# Patient Record
Sex: FEMALE | Race: WHITE | URBAN - METROPOLITAN AREA
[De-identification: names, ages, dates, MRNs, and addresses within clinical notes are randomized per-mention and may not be internally consistent; named-entity substitution may affect disease eponyms.]

---

## 2020-06-15 ENCOUNTER — OFFICE VISIT (OUTPATIENT)
Dept: SURGERY | Age: 25
End: 2020-06-15

## 2020-06-15 VITALS
HEART RATE: 70 BPM | DIASTOLIC BLOOD PRESSURE: 84 MMHG | HEIGHT: 63 IN | SYSTOLIC BLOOD PRESSURE: 128 MMHG | RESPIRATION RATE: 20 BRPM | TEMPERATURE: 98.4 F | WEIGHT: 227 LBS | BODY MASS INDEX: 40.22 KG/M2

## 2020-06-15 DIAGNOSIS — R19.8 UMBILICAL DISCHARGE: Primary | ICD-10-CM

## 2020-06-15 DIAGNOSIS — E66.01 OBESITY, MORBID (HCC): ICD-10-CM

## 2020-06-15 RX ORDER — ETONOGESTREL 68 MG/1
IMPLANT SUBCUTANEOUS
COMMUNITY

## 2020-06-15 RX ORDER — CITALOPRAM 40 MG/1
TABLET, FILM COATED ORAL
COMMUNITY

## 2020-06-15 NOTE — Clinical Note
6/15/20 Patient: Radha Palacios YOB: 1995 Date of Visit: 6/15/2020 Erin Gomez MD 
[address] [facility] [address] VIA Facsimile: [phone] Dear Erin Gomez MD, Thank you for referring Ms. Sivan Atkins to [address] for evaluation. My notes for this consultation are attached. If you have questions, please do not hesitate to call me. I look forward to following your patient along with you.  
 
 
Sincerely, 
 
Argenis Rao MD

## 2020-06-15 NOTE — PROGRESS NOTES
To:  Chari Vera NP  CC:  Vonnie Mcallister MD, Giorgio Lewis MD    From: Karina Fung MD    Thank you for sending Nuvia Alatorre to see us. Please note that this dictation was completed with Netlog, the computer voice recognition software. Quite often unanticipated grammatical, syntax, homophones, and other interpretive errors are inadvertently transcribed by the software. Please disregard these errors. Please excuse any errors that have escaped final proofreading. Encounter Date: 6/15/2020  History and Physical    Assessment:   Intermittent umbilical irritation. No obvious sebaceous cyst or hernia on exam.    No prior laparoscopies via umbilical incision. Body mass index is 40.21 kg/m². Plan:   I recommended washing the umbilicus with a bulb syringe instead of Q-tips. Q-tips are likely resulting in impacted material with subsequent irritation. If this continues to be a problem despite this change, we would end up needing to get a CAT scan as ultrasound is unreliable in the umbilical area due to air interference with sound waves. HPI:   Nuvia Alatorre is a 30 y.o. female who is seen in consultation at the request of Chari Vera for evaluation of a umbilical bloody discharge. She says it has been happening about once a month for about a year. She has a nexplanon so she is not getting her menses and therefore is unsure if it is related to her cycle. She says she has PCO S. She has never had any abdominal operations including diagnostic laparoscopies. There is no history of endometriosis she states that she tries to clean her bellybutton that well using Q-tips. 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 Past Medical History:   Diagnosis Date    Anxiety     Depression      History reviewed. No pertinent surgical history.    Family History   Problem Relation Age of Onset    Heart Disease Mother     Hypertension Mother    Cheryle Salomon Stroke Mother     Diabetes Mother      Social History     Tobacco Use    Smoking status: Current Every Day Smoker    Smokeless tobacco: Never Used   Substance Use Topics    Alcohol use: Yes      Current Outpatient Medications   Medication Sig    citalopram (CeleXA) 40 mg tablet Celexa 40 mg tablet   Take 1 tablet every day by oral route.  etonogestreL (Nexplanon) 68 mg impl Nexplanon 68 mg subdermal implant   Inject by subcutaneous route. No current facility-administered medications for this visit. Allergies:  No Known Allergies    Review of Systems:  10 systems reviewed. See scanned sheet in \"Media\" section. See HPI for pertinent positives and negatives. Objective:     Visit Vitals  /84   Pulse 70   Temp 98.4 °F (36.9 °C)   Resp 20   Ht 5' 3\" (1.6 m)   Wt 103 kg (227 lb)   BMI 40.21 kg/m²       Physical Exam:  General appearance  Alert, cooperative, no distress, appears stated age   [de-identified] Anicteric           Lungs   Clear to auscultation bilaterally   Heart  Regular rate and rhythm. Abdomen   Soft, non-tender. Diastasis recti. No hernia. No blood or discharge in the umbilicus. No obvious sebaceous cyst at the base. A single free hair. Extremities no cyanosis or edema   Pulses 2+ right radial       Lymph nodes No palpable  LAD.    Neurologic Without overt sensory or motor deficit       Signed By: Morena Christian MD     Anahi 15, 2020

## 2020-06-15 NOTE — PROGRESS NOTES
Chief Complaint   Patient presents with    Advice Only     seen at the request of Dr. Katalina chandler from [facility] 93        Discussed advanced directive. Patient states that she does not have an advanced directive.

## 2022-03-19 PROBLEM — E66.01 OBESITY, MORBID (HCC): Status: ACTIVE | Noted: 2020-06-15

## 2023-03-21 LAB
ABO, EXTERNAL RESULT: NORMAL
C. TRACHOMATIS, EXTERNAL RESULT: NEGATIVE
HEP B, EXTERNAL RESULT: NEGATIVE
HEPATITIS C ANTIBODY, EXTERNAL RESULT: NONREACTIVE
HIV, EXTERNAL RESULT: NONREACTIVE
N. GONORRHOEAE, EXTERNAL RESULT: NEGATIVE
RH FACTOR, EXTERNAL RESULT: NEGATIVE
RUBELLA TITER, EXTERNAL RESULT: NORMAL
T. PALLIDUM (SYPHILIS) ANTIBODY, EXTERNAL RESULT: NONREACTIVE

## 2023-05-12 RX ORDER — ETONOGESTREL 68 MG/1
IMPLANT SUBCUTANEOUS
COMMUNITY

## 2023-05-12 RX ORDER — CITALOPRAM 40 MG/1
TABLET ORAL
COMMUNITY

## 2023-08-04 LAB — RHOGAM, EXTERNAL RESULT: NORMAL

## 2023-10-01 LAB — GBS, EXTERNAL RESULT: NEGATIVE

## 2023-10-11 ENCOUNTER — HOSPITAL ENCOUNTER (OUTPATIENT)
Facility: HOSPITAL | Age: 28
Setting detail: OBSERVATION
Discharge: HOME | End: 2023-10-11
Attending: OBSTETRICS & GYNECOLOGY | Admitting: OBSTETRICS & GYNECOLOGY
Payer: COMMERCIAL

## 2023-10-11 VITALS
OXYGEN SATURATION: 96 % | HEART RATE: 79 BPM | DIASTOLIC BLOOD PRESSURE: 78 MMHG | RESPIRATION RATE: 16 BRPM | TEMPERATURE: 98 F | SYSTOLIC BLOOD PRESSURE: 117 MMHG

## 2023-10-11 PROBLEM — O16.3 ELEVATED BLOOD PRESSURE AFFECTING PREGNANCY IN THIRD TRIMESTER, ANTEPARTUM: Status: ACTIVE | Noted: 2023-10-11

## 2023-10-11 LAB
ALBUMIN SERPL-MCNC: 2.6 G/DL (ref 3.5–5)
ALBUMIN/GLOB SERPL: 0.8 (ref 1.1–2.2)
ALP SERPL-CCNC: 220 U/L (ref 45–117)
ALT SERPL-CCNC: 10 U/L (ref 12–78)
ANION GAP SERPL CALC-SCNC: 7 MMOL/L (ref 5–15)
AST SERPL-CCNC: 9 U/L (ref 15–37)
BASOPHILS # BLD: 0 K/UL (ref 0–0.1)
BASOPHILS NFR BLD: 0 % (ref 0–1)
BILIRUB SERPL-MCNC: 0.4 MG/DL (ref 0.2–1)
BUN SERPL-MCNC: 8 MG/DL (ref 6–20)
BUN/CREAT SERPL: 14 (ref 12–20)
CALCIUM SERPL-MCNC: 8.1 MG/DL (ref 8.5–10.1)
CHLORIDE SERPL-SCNC: 110 MMOL/L (ref 97–108)
CO2 SERPL-SCNC: 21 MMOL/L (ref 21–32)
CREAT SERPL-MCNC: 0.59 MG/DL (ref 0.55–1.02)
CREAT UR-MCNC: 185 MG/DL
DIFFERENTIAL METHOD BLD: ABNORMAL
EOSINOPHIL # BLD: 0.1 K/UL (ref 0–0.4)
EOSINOPHIL NFR BLD: 1 % (ref 0–7)
ERYTHROCYTE [DISTWIDTH] IN BLOOD BY AUTOMATED COUNT: 12.8 % (ref 11.5–14.5)
GLOBULIN SER CALC-MCNC: 3.2 G/DL (ref 2–4)
GLUCOSE SERPL-MCNC: 145 MG/DL (ref 65–100)
HCT VFR BLD AUTO: 39.8 % (ref 35–47)
HGB BLD-MCNC: 13.6 G/DL (ref 11.5–16)
IMM GRANULOCYTES # BLD AUTO: 0.1 K/UL (ref 0–0.04)
IMM GRANULOCYTES NFR BLD AUTO: 1 % (ref 0–0.5)
LYMPHOCYTES # BLD: 1.6 K/UL (ref 0.8–3.5)
LYMPHOCYTES NFR BLD: 16 % (ref 12–49)
MCH RBC QN AUTO: 29 PG (ref 26–34)
MCHC RBC AUTO-ENTMCNC: 34.2 G/DL (ref 30–36.5)
MCV RBC AUTO: 84.9 FL (ref 80–99)
MONOCYTES # BLD: 0.7 K/UL (ref 0–1)
MONOCYTES NFR BLD: 8 % (ref 5–13)
NEUTS SEG # BLD: 7.2 K/UL (ref 1.8–8)
NEUTS SEG NFR BLD: 74 % (ref 32–75)
NRBC # BLD: 0 K/UL (ref 0–0.01)
NRBC BLD-RTO: 0 PER 100 WBC
PLATELET # BLD AUTO: 206 K/UL (ref 150–400)
PMV BLD AUTO: 11.6 FL (ref 8.9–12.9)
POTASSIUM SERPL-SCNC: 4.1 MMOL/L (ref 3.5–5.1)
PROT SERPL-MCNC: 5.8 G/DL (ref 6.4–8.2)
PROT UR-MCNC: 25 MG/DL (ref 0–11.9)
PROT/CREAT UR-RTO: 0.1
RBC # BLD AUTO: 4.69 M/UL (ref 3.8–5.2)
SODIUM SERPL-SCNC: 138 MMOL/L (ref 136–145)
WBC # BLD AUTO: 9.6 K/UL (ref 3.6–11)

## 2023-10-11 PROCEDURE — 36415 COLL VENOUS BLD VENIPUNCTURE: CPT

## 2023-10-11 PROCEDURE — 85025 COMPLETE CBC W/AUTO DIFF WBC: CPT

## 2023-10-11 PROCEDURE — 99285 EMERGENCY DEPT VISIT HI MDM: CPT

## 2023-10-11 PROCEDURE — G0379 DIRECT REFER HOSPITAL OBSERV: HCPCS

## 2023-10-11 PROCEDURE — 82570 ASSAY OF URINE CREATININE: CPT

## 2023-10-11 PROCEDURE — 84156 ASSAY OF PROTEIN URINE: CPT

## 2023-10-11 PROCEDURE — G0378 HOSPITAL OBSERVATION PER HR: HCPCS

## 2023-10-11 PROCEDURE — 80053 COMPREHEN METABOLIC PANEL: CPT

## 2023-10-11 RX ORDER — ACETAMINOPHEN 500 MG
1000 TABLET ORAL EVERY 8 HOURS SCHEDULED
Status: DISCONTINUED | OUTPATIENT
Start: 2023-10-11 | End: 2023-10-11

## 2023-10-11 RX ORDER — ACETAMINOPHEN 500 MG
1000 TABLET ORAL EVERY 8 HOURS PRN
Status: DISCONTINUED | OUTPATIENT
Start: 2023-10-11 | End: 2023-10-11 | Stop reason: HOSPADM

## 2023-10-11 RX ORDER — ONDANSETRON 4 MG/1
4 TABLET, ORALLY DISINTEGRATING ORAL EVERY 8 HOURS PRN
Status: DISCONTINUED | OUTPATIENT
Start: 2023-10-11 | End: 2023-10-11 | Stop reason: HOSPADM

## 2023-10-11 RX ORDER — ONDANSETRON 2 MG/ML
4 INJECTION INTRAMUSCULAR; INTRAVENOUS EVERY 6 HOURS PRN
Status: DISCONTINUED | OUTPATIENT
Start: 2023-10-11 | End: 2023-10-11 | Stop reason: HOSPADM

## 2023-10-11 NOTE — H&P
1612 [facility] labs returned:   Hgb 13.6, Plt 206, Cr 0.59, AST 9, ALT 10  P/C 0.1  Bps: 120s/70s, no mild ranges detected  Patient Vitals for the past 24 hrs:   BP Pulse SpO2   10/11/23 1757 -- -- 96 %   10/11/23 1752 -- -- 98 %   10/11/23 1747 -- -- 98 %   10/11/23 1745 117/78 79 --   10/11/23 1742 -- 83 98 %   10/11/23 1737 -- 81 98 %   10/11/23 1732 -- 80 98 %   10/11/23 1730 119/75 80 --   10/11/23 1727 -- 83 98 %   10/11/23 1722 -- 83 98 %   10/11/23 1712 123/78 80 --   10/11/23 1709 -- 87 98 %         After reviewing findings, pt ntoes this was her last day at work and she was very very highly anxious about that. Reviewed PIH precautions. RTO x 1-2 days for membrane sweep per patient request.   FKCs and labor precautions reviewed. Discharged home.      Hanley Landau, MD
Authorizing Provider   citalopram (CELEXA) 40 MG tablet Celexa 40 mg tablet   Take 1 tablet every day by oral route. Automatic Reconciliation, Ar   etonogestrel (NEXPLANON) 68 MG implant Nexplanon 68 mg subdermal implant   Inject by subcutaneous route. Automatic Reconciliation, Ar        Review of Systems:  A comprehensive review of systems was negative except for that written in the History of Present Illness. Objective:     Vitals: There were no vitals filed for this visit. No data recorded. No data recorded      There were no vitals taken for this visit. General:   alert, appears stated age, and cooperative   Skin:   normal   HEENT:  PERRLA   Lungs:   No increased WOB   Heart:   Regular rate   Breasts:      Abdomen:  Soft, nontender   Pelvis:  deferred   FHT:  145bpm mod variability, +Accels, no decels   Uterine Size: S>D   Presentations:    Cervix: In office check     Dilation: 1cm    Effacement: 70%    Station:  -3    Consistency:     Position:    NO edema on exam    Lab Review   Rh neg   AFP:NML   One hour GTT: abnormal, 3h GTT normal (1/4 abnormal)    Assessment and Plan:     Principal Problem:    Elevated blood pressure affecting pregnancy in third trimester, antepartum  Resolved Problems:    * No resolved hospital problems. *       29yo G1 at 38.3 with elevated blood pressures in the office, sent over for [facility] evaluation. Elevated blood pressures: no pih symptoms of RUQ pain, persistent headaches, blurred vision or BLE edema.    - serial BP monitoring  - PIH labs sent  - Cat 1 fetal tracing  - CEFM while undergoing evaluation    Discomforts of pregnancy:   - tylenol, warm baths reviewed    Pregnancy issues:  - Rh negative, s/p rhogam at 28 weeks  - suspected macrosomia with growth 90%ile at 35w5d (7lb2oz)  - LEEP with PEDRO III with normal paps x2   - APA- normal distal long bones  - Abnormal 1h GTT with normal 3h GTT (1/4 abnormal)    Dispo: pending evaluation    Phuc Alston,

## 2023-10-11 NOTE — PROGRESS NOTES
1630: Patient arrived to L&D room 216 for pre-e rule out. Pt oriented to room and unit, plan of care discussed with pt, pt verbalized understanding. 1650: Dr. Rebecca Lopes at bedside to assess pt. 1740: Dr Rebecca Lopes at bedside, MD stated pt can be discharged home. 1830: Pt given written and verbal discharge instructions, pt verbalized understanding. Pt ambulated off the unit in stable condition.

## 2023-10-17 ENCOUNTER — HOSPITAL ENCOUNTER (INPATIENT)
Facility: HOSPITAL | Age: 28
LOS: 2 days | Discharge: HOME | End: 2023-10-20
Attending: OBSTETRICS & GYNECOLOGY | Admitting: OBSTETRICS & GYNECOLOGY
Payer: COMMERCIAL

## 2023-10-17 PROCEDURE — 86901 BLOOD TYPING SEROLOGIC RH(D): CPT

## 2023-10-17 PROCEDURE — 85027 COMPLETE CBC AUTOMATED: CPT

## 2023-10-17 PROCEDURE — 86900 BLOOD TYPING SEROLOGIC ABO: CPT

## 2023-10-17 PROCEDURE — G0379 DIRECT REFER HOSPITAL OBSERV: HCPCS

## 2023-10-17 PROCEDURE — 36415 COLL VENOUS BLD VENIPUNCTURE: CPT

## 2023-10-17 PROCEDURE — 2580000003 HC RX 258: Performed by: OBSTETRICS & GYNECOLOGY

## 2023-10-17 PROCEDURE — 7210000100 HC LABOR FEE PER 1 HR: Performed by: OBSTETRICS & GYNECOLOGY

## 2023-10-17 PROCEDURE — G0378 HOSPITAL OBSERVATION PER HR: HCPCS

## 2023-10-17 PROCEDURE — 86850 RBC ANTIBODY SCREEN: CPT

## 2023-10-17 PROCEDURE — 80053 COMPREHEN METABOLIC PANEL: CPT

## 2023-10-17 RX ADMIN — SODIUM CHLORIDE, PRESERVATIVE FREE 10 ML: 5 INJECTION INTRAVENOUS at 23:58

## 2023-10-18 ENCOUNTER — ANESTHESIA EVENT (OUTPATIENT)
Facility: HOSPITAL | Age: 28
End: 2023-10-18
Payer: COMMERCIAL

## 2023-10-18 ENCOUNTER — ANESTHESIA (OUTPATIENT)
Facility: HOSPITAL | Age: 28
End: 2023-10-18
Payer: COMMERCIAL

## 2023-10-18 PROBLEM — O16.3 ELEVATED BLOOD PRESSURE AFFECTING PREGNANCY IN THIRD TRIMESTER, ANTEPARTUM: Status: RESOLVED | Noted: 2023-10-11 | Resolved: 2023-10-18

## 2023-10-18 PROBLEM — E66.01 OBESITY, MORBID (HCC): Status: RESOLVED | Noted: 2020-06-15 | Resolved: 2023-10-18

## 2023-10-18 PROBLEM — Z3A.40 40 WEEKS GESTATION OF PREGNANCY: Status: ACTIVE | Noted: 2023-10-18

## 2023-10-18 LAB
ABO + RH BLD: NORMAL
ALBUMIN SERPL-MCNC: 2.6 G/DL (ref 3.5–5)
ALBUMIN/GLOB SERPL: 0.8 (ref 1.1–2.2)
ALP SERPL-CCNC: 230 U/L (ref 45–117)
ALT SERPL-CCNC: 8 U/L (ref 12–78)
ANION GAP SERPL CALC-SCNC: 5 MMOL/L (ref 5–15)
AST SERPL-CCNC: 14 U/L (ref 15–37)
BILIRUB SERPL-MCNC: 0.3 MG/DL (ref 0.2–1)
BLOOD GROUP ANTIBODIES SERPL: NORMAL
BUN SERPL-MCNC: 7 MG/DL (ref 6–20)
BUN/CREAT SERPL: 12 (ref 12–20)
CALCIUM SERPL-MCNC: 8.9 MG/DL (ref 8.5–10.1)
CHLORIDE SERPL-SCNC: 109 MMOL/L (ref 97–108)
CO2 SERPL-SCNC: 25 MMOL/L (ref 21–32)
CREAT SERPL-MCNC: 0.6 MG/DL (ref 0.55–1.02)
ERYTHROCYTE [DISTWIDTH] IN BLOOD BY AUTOMATED COUNT: 12.8 % (ref 11.5–14.5)
GLOBULIN SER CALC-MCNC: 3.2 G/DL (ref 2–4)
GLUCOSE SERPL-MCNC: 88 MG/DL (ref 65–100)
HCT VFR BLD AUTO: 39.9 % (ref 35–47)
HGB BLD-MCNC: 13.6 G/DL (ref 11.5–16)
MCH RBC QN AUTO: 29.2 PG (ref 26–34)
MCHC RBC AUTO-ENTMCNC: 34.1 G/DL (ref 30–36.5)
MCV RBC AUTO: 85.8 FL (ref 80–99)
NRBC # BLD: 0 K/UL (ref 0–0.01)
NRBC BLD-RTO: 0 PER 100 WBC
PLATELET # BLD AUTO: 197 K/UL (ref 150–400)
PMV BLD AUTO: 11.4 FL (ref 8.9–12.9)
POTASSIUM SERPL-SCNC: 3.7 MMOL/L (ref 3.5–5.1)
PROT SERPL-MCNC: 5.8 G/DL (ref 6.4–8.2)
RBC # BLD AUTO: 4.65 M/UL (ref 3.8–5.2)
SODIUM SERPL-SCNC: 139 MMOL/L (ref 136–145)
SPECIMEN EXP DATE BLD: NORMAL
WBC # BLD AUTO: 13.6 K/UL (ref 3.6–11)

## 2023-10-18 PROCEDURE — 6360000002 HC RX W HCPCS: Performed by: NURSE ANESTHETIST, CERTIFIED REGISTERED

## 2023-10-18 PROCEDURE — 1120000000 HC RM PRIVATE OB

## 2023-10-18 PROCEDURE — 7210000100 HC LABOR FEE PER 1 HR: Performed by: OBSTETRICS & GYNECOLOGY

## 2023-10-18 PROCEDURE — 4A1HXCZ MONITORING OF PRODUCTS OF CONCEPTION, CARDIAC RATE, EXTERNAL APPROACH: ICD-10-PCS | Performed by: OBSTETRICS & GYNECOLOGY

## 2023-10-18 PROCEDURE — 2580000003 HC RX 258: Performed by: OBSTETRICS & GYNECOLOGY

## 2023-10-18 PROCEDURE — 2500000003 HC RX 250 WO HCPCS: Performed by: NURSE ANESTHETIST, CERTIFIED REGISTERED

## 2023-10-18 PROCEDURE — 51701 INSERT BLADDER CATHETER: CPT

## 2023-10-18 PROCEDURE — 00HU33Z INSERTION OF INFUSION DEVICE INTO SPINAL CANAL, PERCUTANEOUS APPROACH: ICD-10-PCS | Performed by: ANESTHESIOLOGY

## 2023-10-18 PROCEDURE — 7220000101 HC DELIVERY VAGINAL/SINGLE: Performed by: OBSTETRICS & GYNECOLOGY

## 2023-10-18 PROCEDURE — 6360000002 HC RX W HCPCS: Performed by: OBSTETRICS & GYNECOLOGY

## 2023-10-18 PROCEDURE — 10907ZC DRAINAGE OF AMNIOTIC FLUID, THERAPEUTIC FROM PRODUCTS OF CONCEPTION, VIA NATURAL OR ARTIFICIAL OPENING: ICD-10-PCS | Performed by: OBSTETRICS & GYNECOLOGY

## 2023-10-18 PROCEDURE — 3700000025 EPIDURAL BLOCK: Performed by: ANESTHESIOLOGY

## 2023-10-18 PROCEDURE — 6370000000 HC RX 637 (ALT 250 FOR IP): Performed by: OBSTETRICS & GYNECOLOGY

## 2023-10-18 PROCEDURE — 4A1H74Z MONITORING OF PRODUCTS OF CONCEPTION, CARDIAC ELECTRICAL ACTIVITY, VIA NATURAL OR ARTIFICIAL OPENING: ICD-10-PCS | Performed by: OBSTETRICS & GYNECOLOGY

## 2023-10-18 PROCEDURE — 51702 INSERT TEMP BLADDER CATH: CPT

## 2023-10-18 PROCEDURE — 94761 N-INVAS EAR/PLS OXIMETRY MLT: CPT

## 2023-10-18 PROCEDURE — 10H073Z INSERTION OF MONITORING ELECTRODE INTO PRODUCTS OF CONCEPTION, VIA NATURAL OR ARTIFICIAL OPENING: ICD-10-PCS | Performed by: OBSTETRICS & GYNECOLOGY

## 2023-10-18 PROCEDURE — 3700000156 HC EPIDURAL ANESTHESIA: Performed by: NURSE ANESTHETIST, CERTIFIED REGISTERED

## 2023-10-18 PROCEDURE — 0KQM0ZZ REPAIR PERINEUM MUSCLE, OPEN APPROACH: ICD-10-PCS | Performed by: OBSTETRICS & GYNECOLOGY

## 2023-10-18 RX ORDER — ACETAMINOPHEN 500 MG
1000 TABLET ORAL EVERY 8 HOURS SCHEDULED
Status: DISCONTINUED | OUTPATIENT
Start: 2023-10-18 | End: 2023-10-20 | Stop reason: HOSPADM

## 2023-10-18 RX ORDER — ONDANSETRON 2 MG/ML
4 INJECTION INTRAMUSCULAR; INTRAVENOUS EVERY 6 HOURS PRN
Status: DISCONTINUED | OUTPATIENT
Start: 2023-10-18 | End: 2023-10-18 | Stop reason: SDUPTHER

## 2023-10-18 RX ORDER — ONDANSETRON 2 MG/ML
4 INJECTION INTRAMUSCULAR; INTRAVENOUS EVERY 6 HOURS PRN
Status: DISCONTINUED | OUTPATIENT
Start: 2023-10-18 | End: 2023-10-20 | Stop reason: HOSPADM

## 2023-10-18 RX ORDER — ACETAMINOPHEN 325 MG/1
650 TABLET ORAL EVERY 4 HOURS PRN
Status: DISCONTINUED | OUTPATIENT
Start: 2023-10-18 | End: 2023-10-20 | Stop reason: HOSPADM

## 2023-10-18 RX ORDER — METHYLERGONOVINE MALEATE 0.2 MG/ML
200 INJECTION INTRAVENOUS PRN
Status: DISCONTINUED | OUTPATIENT
Start: 2023-10-18 | End: 2023-10-20 | Stop reason: HOSPADM

## 2023-10-18 RX ORDER — CARBOPROST TROMETHAMINE 250 UG/ML
250 INJECTION, SOLUTION INTRAMUSCULAR PRN
Status: DISCONTINUED | OUTPATIENT
Start: 2023-10-18 | End: 2023-10-20 | Stop reason: HOSPADM

## 2023-10-18 RX ORDER — SODIUM CHLORIDE, SODIUM LACTATE, POTASSIUM CHLORIDE, AND CALCIUM CHLORIDE .6; .31; .03; .02 G/100ML; G/100ML; G/100ML; G/100ML
500 INJECTION, SOLUTION INTRAVENOUS PRN
Status: DISCONTINUED | OUTPATIENT
Start: 2023-10-18 | End: 2023-10-20 | Stop reason: HOSPADM

## 2023-10-18 RX ORDER — EPHEDRINE SULFATE/0.9% NACL/PF 50 MG/5 ML
10 SYRINGE (ML) INTRAVENOUS ONCE
Status: DISCONTINUED | OUTPATIENT
Start: 2023-10-18 | End: 2023-10-20 | Stop reason: HOSPADM

## 2023-10-18 RX ORDER — TRANEXAMIC ACID 10 MG/ML
1000 INJECTION, SOLUTION INTRAVENOUS
Status: ACTIVE | OUTPATIENT
Start: 2023-10-18 | End: 2023-10-19

## 2023-10-18 RX ORDER — FAMOTIDINE 20 MG/1
20 TABLET, FILM COATED ORAL 2 TIMES DAILY
Status: DISCONTINUED | OUTPATIENT
Start: 2023-10-18 | End: 2023-10-20 | Stop reason: HOSPADM

## 2023-10-18 RX ORDER — SODIUM CHLORIDE 0.9 % (FLUSH) 0.9 %
5-40 SYRINGE (ML) INJECTION PRN
Status: DISCONTINUED | OUTPATIENT
Start: 2023-10-18 | End: 2023-10-18 | Stop reason: SDUPTHER

## 2023-10-18 RX ORDER — DOCUSATE SODIUM 100 MG/1
100 CAPSULE, LIQUID FILLED ORAL 2 TIMES DAILY
Status: DISCONTINUED | OUTPATIENT
Start: 2023-10-18 | End: 2023-10-18 | Stop reason: SDUPTHER

## 2023-10-18 RX ORDER — LANOLIN/MINERAL OIL
LOTION (ML) TOPICAL PRN
Status: DISCONTINUED | OUTPATIENT
Start: 2023-10-18 | End: 2023-10-20 | Stop reason: HOSPADM

## 2023-10-18 RX ORDER — DIPHENHYDRAMINE HYDROCHLORIDE 50 MG/ML
25 INJECTION INTRAMUSCULAR; INTRAVENOUS EVERY 4 HOURS PRN
Status: DISCONTINUED | OUTPATIENT
Start: 2023-10-18 | End: 2023-10-20 | Stop reason: HOSPADM

## 2023-10-18 RX ORDER — SODIUM CHLORIDE 9 MG/ML
INJECTION, SOLUTION INTRAVENOUS PRN
Status: DISCONTINUED | OUTPATIENT
Start: 2023-10-18 | End: 2023-10-20 | Stop reason: HOSPADM

## 2023-10-18 RX ORDER — IBUPROFEN 600 MG/1
600 TABLET ORAL EVERY 8 HOURS SCHEDULED
Status: DISCONTINUED | OUTPATIENT
Start: 2023-10-18 | End: 2023-10-20 | Stop reason: HOSPADM

## 2023-10-18 RX ORDER — BUPIVACAINE HYDROCHLORIDE 2.5 MG/ML
INJECTION, SOLUTION EPIDURAL; INFILTRATION; INTRACAUDAL PRN
Status: DISCONTINUED | OUTPATIENT
Start: 2023-10-18 | End: 2023-10-18 | Stop reason: SDUPTHER

## 2023-10-18 RX ORDER — SODIUM CHLORIDE, SODIUM LACTATE, POTASSIUM CHLORIDE, AND CALCIUM CHLORIDE .6; .31; .03; .02 G/100ML; G/100ML; G/100ML; G/100ML
1000 INJECTION, SOLUTION INTRAVENOUS PRN
Status: DISCONTINUED | OUTPATIENT
Start: 2023-10-18 | End: 2023-10-20 | Stop reason: HOSPADM

## 2023-10-18 RX ORDER — FENTANYL 0.2 MG/100ML-BUPIV 0.125%-NACL 0.9% EPIDURAL INJ 2/0.125 MCG/ML-%
10 SOLUTION INJECTION CONTINUOUS
Status: DISCONTINUED | OUTPATIENT
Start: 2023-10-18 | End: 2023-10-20 | Stop reason: HOSPADM

## 2023-10-18 RX ORDER — SODIUM CHLORIDE 0.9 % (FLUSH) 0.9 %
5-40 SYRINGE (ML) INJECTION PRN
Status: DISCONTINUED | OUTPATIENT
Start: 2023-10-18 | End: 2023-10-20 | Stop reason: HOSPADM

## 2023-10-18 RX ORDER — NALOXONE HYDROCHLORIDE 0.4 MG/ML
INJECTION, SOLUTION INTRAMUSCULAR; INTRAVENOUS; SUBCUTANEOUS PRN
Status: DISCONTINUED | OUTPATIENT
Start: 2023-10-18 | End: 2023-10-20 | Stop reason: HOSPADM

## 2023-10-18 RX ORDER — LIDOCAINE HYDROCHLORIDE AND EPINEPHRINE 15; 5 MG/ML; UG/ML
INJECTION, SOLUTION EPIDURAL PRN
Status: DISCONTINUED | OUTPATIENT
Start: 2023-10-18 | End: 2023-10-18 | Stop reason: SDUPTHER

## 2023-10-18 RX ORDER — DOCUSATE SODIUM 100 MG/1
100 CAPSULE, LIQUID FILLED ORAL 2 TIMES DAILY
Status: DISCONTINUED | OUTPATIENT
Start: 2023-10-18 | End: 2023-10-20 | Stop reason: HOSPADM

## 2023-10-18 RX ORDER — SODIUM CHLORIDE, SODIUM LACTATE, POTASSIUM CHLORIDE, CALCIUM CHLORIDE 600; 310; 30; 20 MG/100ML; MG/100ML; MG/100ML; MG/100ML
INJECTION, SOLUTION INTRAVENOUS CONTINUOUS
Status: DISCONTINUED | OUTPATIENT
Start: 2023-10-18 | End: 2023-10-18 | Stop reason: SDUPTHER

## 2023-10-18 RX ORDER — SODIUM CHLORIDE 0.9 % (FLUSH) 0.9 %
5-40 SYRINGE (ML) INJECTION EVERY 12 HOURS SCHEDULED
Status: DISCONTINUED | OUTPATIENT
Start: 2023-10-18 | End: 2023-10-18 | Stop reason: SDUPTHER

## 2023-10-18 RX ORDER — SODIUM CHLORIDE, SODIUM LACTATE, POTASSIUM CHLORIDE, CALCIUM CHLORIDE 600; 310; 30; 20 MG/100ML; MG/100ML; MG/100ML; MG/100ML
INJECTION, SOLUTION INTRAVENOUS CONTINUOUS
Status: DISCONTINUED | OUTPATIENT
Start: 2023-10-18 | End: 2023-10-20 | Stop reason: HOSPADM

## 2023-10-18 RX ORDER — MISOPROSTOL 200 UG/1
400 TABLET ORAL PRN
Status: DISCONTINUED | OUTPATIENT
Start: 2023-10-18 | End: 2023-10-20 | Stop reason: HOSPADM

## 2023-10-18 RX ORDER — SODIUM CHLORIDE 0.9 % (FLUSH) 0.9 %
5-40 SYRINGE (ML) INJECTION EVERY 12 HOURS SCHEDULED
Status: DISCONTINUED | OUTPATIENT
Start: 2023-10-18 | End: 2023-10-20 | Stop reason: HOSPADM

## 2023-10-18 RX ADMIN — SODIUM CHLORIDE, POTASSIUM CHLORIDE, SODIUM LACTATE AND CALCIUM CHLORIDE: 600; 310; 30; 20 INJECTION, SOLUTION INTRAVENOUS at 09:59

## 2023-10-18 RX ADMIN — Medication 166.7 ML: at 10:35

## 2023-10-18 RX ADMIN — SODIUM CHLORIDE, POTASSIUM CHLORIDE, SODIUM LACTATE AND CALCIUM CHLORIDE: 600; 310; 30; 20 INJECTION, SOLUTION INTRAVENOUS at 00:02

## 2023-10-18 RX ADMIN — BUPIVACAINE HYDROCHLORIDE 7 ML: 2.5 INJECTION, SOLUTION EPIDURAL; INFILTRATION; INTRACAUDAL; PERINEURAL at 02:02

## 2023-10-18 RX ADMIN — SODIUM CHLORIDE, POTASSIUM CHLORIDE, SODIUM LACTATE AND CALCIUM CHLORIDE: 600; 310; 30; 20 INJECTION, SOLUTION INTRAVENOUS at 04:36

## 2023-10-18 RX ADMIN — DOCUSATE SODIUM 100 MG: 100 CAPSULE, LIQUID FILLED ORAL at 21:42

## 2023-10-18 RX ADMIN — LIDOCAINE HYDROCHLORIDE AND EPINEPHRINE 2 ML: 15; 5 INJECTION, SOLUTION EPIDURAL at 01:58

## 2023-10-18 RX ADMIN — LIDOCAINE HYDROCHLORIDE AND EPINEPHRINE 1 ML: 15; 5 INJECTION, SOLUTION EPIDURAL at 02:02

## 2023-10-18 RX ADMIN — SODIUM CHLORIDE, POTASSIUM CHLORIDE, SODIUM LACTATE AND CALCIUM CHLORIDE: 600; 310; 30; 20 INJECTION, SOLUTION INTRAVENOUS at 01:48

## 2023-10-18 RX ADMIN — Medication 10 ML/HR: at 02:18

## 2023-10-18 RX ADMIN — IBUPROFEN 600 MG: 600 TABLET ORAL at 15:32

## 2023-10-18 ASSESSMENT — PAIN SCALES - GENERAL: PAINLEVEL_OUTOF10: 3

## 2023-10-18 ASSESSMENT — PAIN DESCRIPTION - DESCRIPTORS: DESCRIPTORS: SORE

## 2023-10-18 ASSESSMENT — PAIN DESCRIPTION - LOCATION: LOCATION: PELVIS

## 2023-10-18 NOTE — PROGRESS NOTES
11:28 PM  Patient presents with a complaint of contractions that started last night and have gotten worse throughout the day after a membrane sweep yesterday. Patient placed on the monitor assumed care at this time. 11:44 PM  Dr. Cl Nicole notified of the patients arrival and complaint. History and VS reviewed. He states he will be in to see her soon. 11:48 PM  Dr. Cl Nicole at the bedside to meet the patient and check her cervix. 1:39 AM  Patient transferred to labor room 204 from triage. Continued care of the patient. 1:42 AM  Patient sitting on the side of the bed for epidural placement. Attempting to continuously trace the FHR.  3:05 AM  Patient switched over to a portable monitor to monitor the FHR and contractions due to a continuous glitch in the FHR tracing. RN remains at the bedside. 3:33 AM  Patients arm measured to ensure proper size b/p cuff. Patients arm measures 34 cm.  7:24 AM  Bedside SBAR report given to Irwin Andrea RN. Care of the patient turned over at this time.

## 2023-10-18 NOTE — ANESTHESIA PROCEDURE NOTES
Epidural Block    Patient location during procedure: OB  Start time: 10/18/2023 1:46 AM  End time: 10/18/2023 2:02 AM  Reason for block: labor epidural  Staffing  Performed: resident/CRNA   Anesthesiologist: Rojelio Moeller MD  Resident/CRNA: ANN Lopez CRNA  Performed by: ANN Lopez CRNA  Authorized by: Rojelio Moeller MD    Epidural  Patient position: sitting  Prep: ChloraPrep  Patient monitoring: continuous pulse ox and frequent blood pressure checks  Approach: midline  Location: L3-4  Injection technique: GLORIA saline  Provider prep: sterile gloves and mask  Needle  Needle type: Tuohy   Needle gauge: 17 G  Needle length: 3.5 in  Needle insertion depth: 9 cm  Catheter type: multi-orifice  Catheter size: 20 G  Catheter at skin depth: 14 cm  Test dose: negativeCatheter Secured: tegaderm and tape  Assessment  Hemodynamics: stable  Attempts: 2  Outcomes: uncomplicated and patient tolerated procedure well  Preanesthetic Checklist  Completed: patient identified, IV checked, site marked, risks and benefits discussed, surgical/procedural consents, equipment checked, pre-op evaluation, timeout performed, anesthesia consent given, oxygen available, monitors applied/VS acknowledged, fire risk safety assessment completed and verbalized and blood product R/B/A discussed and consented

## 2023-10-18 NOTE — L&D DELIVERY NOTE
Noe Robb, Donavon French [[id number]]      Labor Events     Labor: No   Steroids: None  Cervical Ripening Date/Time:      Antibiotics Received during Labor: No  Rupture Identifier: Sac 1  Rupture Date/Time:  10/18/23 08:07:00   Rupture Type: AROM  Fluid Color: Clear  Fluid Odor: None  Fluid Volume: Scant  Induction: None  Augmentation: AROM  Labor Complications: Fetal Intolerance              Anesthesia    Method: Epidural       Labor Event Times      Labor onset date/time:  10/17/23 20:30:00 EDT     Dilation complete date/time:  10/18/23 07:40:00 EDT     Start pushing date/time:  10/18/2023 07:49:00   Decision date/time (emergent ):            Labor Length    1st stage: 11h 10m  2nd stage: 2h 52m  3rd stage: 0h 03m       Delivery Details      Delivery Date: 10/18/23 Delivery Time: 10:32:00   Delivery Type: Vaginal, Spontaneous              Casco Presentation    Presentation: Vertex  Position: Middle  _: Occiput  _: Anterior       Shoulder Dystocia    Shoulder Dystocia Present?: No                                                                                                           Assisted Delivery Details    Forceps Attempted?: No  Vacuum Extractor Attempted?: No                                                             Cord    Vessels: 3 Vessels  Complications: Nuchal Loose  Cord Around: Head  Delayed Cord Clamping?: Yes  Cord Clamped Date/Time: 10/18/2023 10:33:00  Cord Blood Disposition: Lab  Gases Sent?: No   Cord Comments:  PROCEDURAL - OBSTETRIC - CORD OBSERVATION   cord in fetal hand             Placenta    Date/Time: 10/18/2023 10:35:00  Removal: Expressed  Appearance: Intact  Disposition: Discarded       Lacerations    Episiotomy: None  Perineal Lacerations: 2nd  Other Lacerations: sulcus laceration  Sulcus Laceration: right Repaired?: Yes   Number of Repair Packets: 1       Vaginal Counts    Initial Count Personnel: WADE ROACH  Initial Count Verified By: Darian Birch MD  Intial

## 2023-10-18 NOTE — PROGRESS NOTES
0710 Bedside and Verbal shift change report given to Mayelin Lawrence RN (oncoming nurse) by Jasmyn Becerra RN (offgoing nurse). Report included the following information Nurse Handoff Report, Adult Overview, Intake/Output, MAR, Recent Results, and Med Rec Status. 18 Dr. Junior Barber at bedside, sve 10/100/+1    5976 Patient actively pushing. RN remains in continuous attendance at the bedside. Assessment & evaluation of fetal heart rate ongoing via continuous EFM.    4609 Dr. Alena Khoury at bedside. Arom 7284    9420 O2 applied to mom, fluid bolus started, fse placed by dr. Alena Khoury. Charge RN notified. 0856 FSE placed and HR confirmed US heartrate. 9103 Stop pushing per Dr. Alena Khoury MD remained at bedside with this RN. [address] Patient actively pushing. RN remains in continuous attendance at the bedside. Assessment & evaluation of fetal heart rate ongoing via continuous EFM. [address] remained at bedside throughout pushing. EFM continuously assessed. Vaginal delivery of viable infant. Apgars 6/9, right sulcus and 2nd degree lac repaired. Delivery QBL 700ml. 2hr QBL 70. Total .     1350 TRANSFER - OUT REPORT:    Verbal report given to Mateo on [employer]  being transferred to MIU rm 444 for routine progression of care. Report consisted of patient's Situation, Background, Assessment and   Recommendations(SBAR). Information from the following report(s) Nurse Handoff Report, Index, Adult Overview, Intake/Output, MAR, Recent Results, and Med Rec Status was reviewed with the receiving nurse. Lines:   Peripheral IV 10/17/23 Distal;Posterior;Right Forearm (Active)   Site Assessment Clean, dry & intact 10/18/23 1035   Line Status Blood return noted; Flushed; Infusing 10/18/23 0032   Phlebitis Assessment No symptoms 10/18/23 1035   Infiltration Assessment 0 10/18/23 1035   Alcohol Cap Used No 10/18/23 0032   Dressing Status Clean, dry & intact 10/18/23 1035   Dressing Type Transparent 10/18/23 1035        Opportunity for questions and clarification was provided. Patient transported with:  Registered Nurse  Baby bands and fundus confirmed with receiving RN.

## 2023-10-19 PROCEDURE — 6370000000 HC RX 637 (ALT 250 FOR IP): Performed by: OBSTETRICS & GYNECOLOGY

## 2023-10-19 PROCEDURE — 86901 BLOOD TYPING SEROLOGIC RH(D): CPT

## 2023-10-19 PROCEDURE — 86900 BLOOD TYPING SEROLOGIC ABO: CPT

## 2023-10-19 PROCEDURE — 96372 THER/PROPH/DIAG INJ SC/IM: CPT

## 2023-10-19 PROCEDURE — 36415 COLL VENOUS BLD VENIPUNCTURE: CPT

## 2023-10-19 PROCEDURE — 94761 N-INVAS EAR/PLS OXIMETRY MLT: CPT

## 2023-10-19 PROCEDURE — 1120000000 HC RM PRIVATE OB

## 2023-10-19 PROCEDURE — 85461 HEMOGLOBIN FETAL: CPT

## 2023-10-19 RX ADMIN — DOCUSATE SODIUM 100 MG: 100 CAPSULE, LIQUID FILLED ORAL at 20:10

## 2023-10-19 RX ADMIN — IBUPROFEN 600 MG: 600 TABLET ORAL at 12:32

## 2023-10-19 RX ADMIN — ACETAMINOPHEN 650 MG: 325 TABLET ORAL at 04:40

## 2023-10-19 RX ADMIN — IBUPROFEN 600 MG: 600 TABLET ORAL at 21:25

## 2023-10-19 RX ADMIN — IBUPROFEN 600 MG: 600 TABLET ORAL at 00:13

## 2023-10-19 ASSESSMENT — PAIN DESCRIPTION - LOCATION
LOCATION: GENERALIZED
LOCATION: ABDOMEN

## 2023-10-19 ASSESSMENT — PAIN SCALES - GENERAL
PAINLEVEL_OUTOF10: 6
PAINLEVEL_OUTOF10: 4
PAINLEVEL_OUTOF10: 3
PAINLEVEL_OUTOF10: 2

## 2023-10-19 ASSESSMENT — PAIN - FUNCTIONAL ASSESSMENT: PAIN_FUNCTIONAL_ASSESSMENT: ACTIVITIES ARE NOT PREVENTED

## 2023-10-19 ASSESSMENT — PAIN DESCRIPTION - DESCRIPTORS
DESCRIPTORS: ACHING
DESCRIPTORS: SORE

## 2023-10-20 VITALS
TEMPERATURE: 98.1 F | OXYGEN SATURATION: 98 % | HEIGHT: 64 IN | BODY MASS INDEX: 34.83 KG/M2 | SYSTOLIC BLOOD PRESSURE: 114 MMHG | DIASTOLIC BLOOD PRESSURE: 70 MMHG | RESPIRATION RATE: 14 BRPM | WEIGHT: 204 LBS | HEART RATE: 65 BPM

## 2023-10-20 LAB
ABO + RH BLD: NORMAL
BLD PROD TYP BPU: NORMAL
BLOOD BANK DISPENSE STATUS: NORMAL
BPU ID: NORMAL
FETAL SCREEN: NORMAL
UNIT DIVISION: 0

## 2023-10-20 PROCEDURE — 94761 N-INVAS EAR/PLS OXIMETRY MLT: CPT

## 2023-10-20 PROCEDURE — 6370000000 HC RX 637 (ALT 250 FOR IP): Performed by: OBSTETRICS & GYNECOLOGY

## 2023-10-20 RX ORDER — IBUPROFEN 600 MG/1
600 TABLET ORAL EVERY 6 HOURS PRN
Qty: 60 TABLET | Refills: 0 | Status: SHIPPED | OUTPATIENT
Start: 2023-10-20

## 2023-10-20 RX ORDER — DOCUSATE SODIUM 100 MG/1
100 CAPSULE, LIQUID FILLED ORAL 2 TIMES DAILY
Qty: 60 CAPSULE | Refills: 0 | Status: SHIPPED | OUTPATIENT
Start: 2023-10-20 | End: 2023-11-19

## 2023-10-20 RX ADMIN — IBUPROFEN 600 MG: 600 TABLET ORAL at 04:45

## 2023-10-20 ASSESSMENT — PAIN SCALES - GENERAL: PAINLEVEL_OUTOF10: 4

## 2023-10-20 ASSESSMENT — PAIN DESCRIPTION - ORIENTATION: ORIENTATION: LOWER

## 2023-10-20 ASSESSMENT — PAIN DESCRIPTION - DESCRIPTORS: DESCRIPTORS: SORE

## 2023-10-20 ASSESSMENT — PAIN DESCRIPTION - LOCATION: LOCATION: ABDOMEN

## 2023-10-20 ASSESSMENT — PAIN - FUNCTIONAL ASSESSMENT: PAIN_FUNCTIONAL_ASSESSMENT: ACTIVITIES ARE NOT PREVENTED

## 2023-10-20 NOTE — DISCHARGE SUMMARY
Obstetrical Discharge Summary     Name: Ying Talbert MRN: [id number]  SSN: [id number]    YOB: 1995  Age: 25 y.o. Sex: female      Admit Date: 10/17/2023    Discharge Date: 10/20/2023     Admitting Physician: Tony Mccauley MD     Attending Physician:  Kimberli Perez MD     Admission Diagnoses: 40 weeks gestation of pregnancy [Z3A.40]    Discharge Diagnoses:   Information for the patient's :  Abida Randall, Male Fern Edu [[id number]]   @[phone]@      Additional Diagnoses:  No components found for: \"OBEXTABORH\", \"OBEXTABSCRN\", \"OBEXTRUBELLA\", \"OBEXTGRBS\"    Hospital Course:  p term labor. gHTN, no meds. Normal hospital course following the delivery. Will follow up for BP check in office in 1 wk. Has cuff at home and will check daily. Has call parameters. Patient Instructions:   Current Discharge Medication List        START taking these medications    Details   ibuprofen (ADVIL;MOTRIN) 600 MG tablet Take 1 tablet by mouth every 6 hours as needed for Pain  Qty: 60 tablet, Refills: 0      docusate sodium (COLACE) 100 MG capsule Take 1 capsule by mouth 2 times daily  Qty: 60 capsule, Refills: 0           CONTINUE these medications which have NOT CHANGED    Details   citalopram (CELEXA) 40 MG tablet Take 0.5 tablets by mouth daily             Disposition at Discharge: Home or self care    Condition at Discharge: Stable    Reference my discharge instructions. No follow-ups on file.      Signed By:  Krys Ash MD     2023

## 2023-10-20 NOTE — LACTATION NOTE
This note was copied from a baby's chart. Mother states baby fed well overnight. Was sleepy yesterday post circ. Mother states baby just fed. Baby fussy and rooting, mother latched baby. Baby took few sucks then fell asleep. Discharge info reviewed. Chart shows numerous feedings, void, stool WNL. Discussed importance of monitoring outputs and feedings on first week of life. Discussed ways to tell if baby is  getting enough breast milk, ie  voids and stools, change in color of stool, and return to birth wt within 2 weeks. Follow up with pediatrician visit for weight check in 1-2 days (per AAP guidelines.)  Encouraged to call Warm Line  [phone]  for any questions/problems that arise. Mother also given breastfeeding support group dates and times for any future needs     Engorgement Care Guidelines:  Reviewed how milk is made and normal phases of milk production. Taught care of engorged breasts - physiologic breastfeeding encouraged with use of cool packs (no ice directly on skin). Consider use of NSAIDS where appropriate for discomfort and inflammation. Can employ light touch, lymphatic drainage techniques on tender grandular tissues. Anticipatory guidance shared. Pt will successfully establish breastfeeding by feeding in response to early feeding cues   or wake every 3h, will obtain deep latch, and will keep log of feedings/output. Taught to BF at hunger cues and or q 2-3 hrs and to offer 10-20 drops of hand expressed colostrum at any non-feeds. Left Breast: Soft  Left Nipple: Protrude with stimulation  Right Nipple: Protrude with stimulation  Right Breast: Soft  Position and Latch:  Independently, With assistance  Signs of Transfer: Nutritive sucking  Maternal Response: Attentive  Infant Supplementation: Expressed Breast Milk (20 large drops of colostrum)        Latch: Grasps breast, tongue down, lips flanged, rhythmic sucking  Audible Swallowing: A few with stimulation  Type of Nipple: Everted
This note was copied from a baby's chart. This is mother's first baby. Mother states baby latched on and breast fed well after delivery. Baby was sleepy for this feeding - mother taught to do hand expression. Discussed with mother her plan for feeding. Reviewed the benefits of exclusive breast milk feeding during the hospital stay. Informed her of the risks of using formula to supplement in the first few days of life as well as the benefits of successful breast milk feeding; referred her to the Breastfeeding booklet about this information. She acknowledges understanding of information reviewed and states that it is her plan to breastfeed her infant. Will support her choice and offer additional information as needed. Encouraged mom to attempt feeding with baby led feeding cues. Just as sucking on fingers, rooting, mouthing. Looking for 8-12 feedings in 24 hours. Don't limit baby at breast, allow baby to come of breast on it's own. Baby may want to feed  often and may increase number of feedings on second day of life. Skin to skin encouraged. If baby doesn't nurse,  Mom should  hand express  10-20 drops of colostrum and drip into baby's mouth, or give to baby by finger feeding, cup feeding, or spoon feeding at least every 2-3 hours. Mother will successfully establish breastfeeding by feeding in response to early feeding cues   or wake every 3h, will obtain deep latch, and will keep log of feedings/output. Taught to BF at hunger cues and or q 2-3 hrs and to offer 10-20 drops of hand expressed colostrum at any non-feeds. Left Breast: Soft  Left Nipple: Protrude (short)  Right Nipple: Protrude (short)  Right Breast: Soft  Position and Latch:  With assistance        Infant Supplementation: Expressed Breast Milk (20 drops of colostrum)        Latch: Grasps breast, tongue down, lips flanged, rhythmic sucking  Audible Swallowing: A few with stimulation  Type of Nipple: Everted (after
with double electric set up. Assisted with pump session. List of area pump rental locations and lactation support services provided. Mother will successfully establish breastfeeding by feeding in response to early feeding cues   or wake every 3h, will obtain deep latch, and will keep log of feedings/output. Taught to BF at hunger cues and or q 2-3 hrs and to offer 10-20 drops of hand expressed colostrum at any non-feeds. Left Breast: Soft  Left Nipple: Protrude with stimulation, Flat (short)  Right Nipple: Protrude with stimulation, Flat (short)  Right Breast: Soft  Position and Latch: With assistance, Provides breast support  Signs of Transfer: Non-nutritive sucking  Maternal Response: Attentive,  Comfortable with position  Infant Supplementation: Expressed Breast Milk (20 large drops of colostrum)            Breast Care: Bra on, Using breast pump, Lanolin provided, Nursing pads, Pumping supply provided     Lactation Comment: Baby circumcised this am - he is sleepy. Breastfeeding attempted. Mother has short/flat nipples and baby had latch difficutly. Nipple shield applied. Baby did latch and suckled a few times on left breast in crade position. Mother then hand expressed 20 drops of colostrum which was finger fed to baby. Mother to pump with symphony pump if baby does not breastfeed to stimulate her milk supply.

## 2023-10-20 NOTE — PROGRESS NOTES
1225- Discharge instructions reviewed with and signed by patient. Patient verbalized understanding. [phone]- Patient discharged home. Patient requested to walk to exit. Escorted by [facility] tech. SO and baby at side.

## 2023-10-23 NOTE — ANESTHESIA POSTPROCEDURE EVALUATION
Department of Anesthesiology  Postprocedure Note    Patient: Jorge Body  MRN: [id number]  YOB: 1995  Date of evaluation: 10/23/2023      Procedure Summary     Date: 10/18/23 Room / Location:     Anesthesia Start: 0145 Anesthesia Stop: 3321    Procedure: Labor Analgesia Diagnosis:     Scheduled Providers:  Responsible Provider: Taylor Kamara MD    Anesthesia Type: epidural ASA Status: 2          Anesthesia Type: No value filed.     Zulema Phase I:      Zulema Phase II:        Anesthesia Post Evaluation    Patient location during evaluation: PACU  Patient participation: complete - patient participated  Level of consciousness: awake  Airway patency: patent  Nausea & Vomiting: no vomiting and no nausea  Complications: no  Cardiovascular status: hemodynamically stable  Respiratory status: acceptable  Hydration status: stable  Pain management: adequate